# Patient Record
Sex: FEMALE | Race: WHITE | NOT HISPANIC OR LATINO
[De-identification: names, ages, dates, MRNs, and addresses within clinical notes are randomized per-mention and may not be internally consistent; named-entity substitution may affect disease eponyms.]

---

## 2017-03-28 ENCOUNTER — APPOINTMENT (OUTPATIENT)
Dept: HEART AND VASCULAR | Facility: CLINIC | Age: 67
End: 2017-03-28

## 2017-12-15 ENCOUNTER — APPOINTMENT (OUTPATIENT)
Dept: HEART AND VASCULAR | Facility: CLINIC | Age: 67
End: 2017-12-15
Payer: MEDICARE

## 2017-12-15 VITALS — SYSTOLIC BLOOD PRESSURE: 140 MMHG | HEART RATE: 75 BPM | DIASTOLIC BLOOD PRESSURE: 100 MMHG

## 2017-12-15 VITALS — SYSTOLIC BLOOD PRESSURE: 140 MMHG | HEART RATE: 74 BPM | DIASTOLIC BLOOD PRESSURE: 90 MMHG

## 2017-12-15 VITALS — SYSTOLIC BLOOD PRESSURE: 138 MMHG | DIASTOLIC BLOOD PRESSURE: 94 MMHG

## 2017-12-15 PROCEDURE — 99215 OFFICE O/P EST HI 40 MIN: CPT | Mod: 25

## 2017-12-15 PROCEDURE — 93000 ELECTROCARDIOGRAM COMPLETE: CPT

## 2017-12-15 PROCEDURE — 93306 TTE W/DOPPLER COMPLETE: CPT

## 2017-12-15 RX ORDER — MOMETASONE FUROATE 1 MG/G
0.1 OINTMENT TOPICAL
Qty: 45 | Refills: 0 | Status: DISCONTINUED | COMMUNITY
Start: 2017-11-20

## 2018-01-03 ENCOUNTER — TRANSCRIPTION ENCOUNTER (OUTPATIENT)
Age: 68
End: 2018-01-03

## 2018-01-16 ENCOUNTER — TRANSCRIPTION ENCOUNTER (OUTPATIENT)
Age: 68
End: 2018-01-16

## 2018-04-19 ENCOUNTER — APPOINTMENT (OUTPATIENT)
Dept: HEART AND VASCULAR | Facility: CLINIC | Age: 68
End: 2018-04-19
Payer: MEDICARE

## 2018-04-19 VITALS
WEIGHT: 134 LBS | HEART RATE: 78 BPM | HEIGHT: 60 IN | SYSTOLIC BLOOD PRESSURE: 134 MMHG | DIASTOLIC BLOOD PRESSURE: 100 MMHG | BODY MASS INDEX: 26.31 KG/M2

## 2018-04-19 VITALS — SYSTOLIC BLOOD PRESSURE: 144 MMHG | DIASTOLIC BLOOD PRESSURE: 88 MMHG

## 2018-04-19 DIAGNOSIS — I51.89 OTHER ILL-DEFINED HEART DISEASES: ICD-10-CM

## 2018-04-19 PROCEDURE — 93000 ELECTROCARDIOGRAM COMPLETE: CPT

## 2018-04-19 PROCEDURE — 99214 OFFICE O/P EST MOD 30 MIN: CPT | Mod: 25

## 2018-04-19 RX ORDER — FLUTICASONE PROPIONATE 50 UG/1
50 SPRAY, METERED NASAL
Qty: 16 | Refills: 0 | Status: DISCONTINUED | COMMUNITY
Start: 2018-01-21

## 2018-04-20 PROBLEM — I51.89 FAMILIAL HEART DISEASE: Status: ACTIVE | Noted: 2018-04-20

## 2018-12-17 ENCOUNTER — APPOINTMENT (OUTPATIENT)
Dept: HEART AND VASCULAR | Facility: CLINIC | Age: 68
End: 2018-12-17
Payer: MEDICARE

## 2018-12-17 ENCOUNTER — NON-APPOINTMENT (OUTPATIENT)
Age: 68
End: 2018-12-17

## 2018-12-17 VITALS
WEIGHT: 142 LBS | DIASTOLIC BLOOD PRESSURE: 86 MMHG | SYSTOLIC BLOOD PRESSURE: 124 MMHG | HEIGHT: 60 IN | HEART RATE: 62 BPM | BODY MASS INDEX: 27.88 KG/M2

## 2018-12-17 DIAGNOSIS — R00.2 PALPITATIONS: ICD-10-CM

## 2018-12-17 PROCEDURE — 93000 ELECTROCARDIOGRAM COMPLETE: CPT

## 2018-12-17 PROCEDURE — 99214 OFFICE O/P EST MOD 30 MIN: CPT | Mod: 25

## 2018-12-17 PROCEDURE — 93306 TTE W/DOPPLER COMPLETE: CPT

## 2018-12-17 NOTE — PHYSICAL EXAM
[Normal Oral Mucosa] : normal oral mucosa [No Oral Pallor] : no oral pallor [No Oral Cyanosis] : no oral cyanosis [Normal Jugular Venous A Waves Present] : normal jugular venous A waves present [Normal Jugular Venous V Waves Present] : normal jugular venous V waves present [No Jugular Venous Alvarez A Waves] : no jugular venous alvarez A waves [Respiration, Rhythm And Depth] : normal respiratory rhythm and effort [Exaggerated Use Of Accessory Muscles For Inspiration] : no accessory muscle use [Auscultation Breath Sounds / Voice Sounds] : lungs were clear to auscultation bilaterally [Heart Rate And Rhythm] : heart rate and rhythm were normal [Heart Sounds] : normal S1 and S2 [Murmurs] : no murmurs present [FreeTextEntry1] : 1/6 Systolic ejection murmur heard best at the left sternal border, R dp 1, L 2. [Abdomen Soft] : soft [Abdomen Tenderness] : non-tender [Abdomen Mass (___ Cm)] : no abdominal mass palpated [Nail Clubbing] : no clubbing of the fingernails [Cyanosis, Localized] : no localized cyanosis [Petechial Hemorrhages (___cm)] : no petechial hemorrhages [] : no ischemic changes

## 2018-12-17 NOTE — HISTORY OF PRESENT ILLNESS
[FreeTextEntry1] : The patient has occasional palpitations. They're mild brief and passed spontaneously. She walks for an hour without difficulty. She has dyspnea climbing 3 flights of stairs. The patient's blood pressure at home is 108-129/72-83.

## 2018-12-17 NOTE — DISCUSSION/SUMMARY
[FreeTextEntry1] : The patient has occasional brief palpitations. Her EKG is normal. The echocardiogram shows bileaflet mitral valve prolapse with mild to moderate mitral regurgitation. The ejection fraction is 60%. The regurgitation is not hemodynamically significant. The patient's exercise tolerance is good. She has a history of PVCs. Serious arrhythmia is unlikely. She will continue her atorvastatin.

## 2018-12-17 NOTE — REVIEW OF SYSTEMS
[Dyspnea on exertion] : dyspnea during exertion [Palpitations] : palpitations [Dizziness] : dizziness [Negative] : Heme/Lymph

## 2019-03-13 ENCOUNTER — RX RENEWAL (OUTPATIENT)
Age: 69
End: 2019-03-13

## 2019-06-05 ENCOUNTER — RX RENEWAL (OUTPATIENT)
Age: 69
End: 2019-06-05

## 2019-06-17 ENCOUNTER — TRANSCRIPTION ENCOUNTER (OUTPATIENT)
Age: 69
End: 2019-06-17

## 2019-06-20 ENCOUNTER — APPOINTMENT (OUTPATIENT)
Dept: HEART AND VASCULAR | Facility: CLINIC | Age: 69
End: 2019-06-20
Payer: MEDICARE

## 2019-06-20 DIAGNOSIS — I49.3 VENTRICULAR PREMATURE DEPOLARIZATION: ICD-10-CM

## 2019-06-20 PROCEDURE — 93351 STRESS TTE COMPLETE: CPT

## 2019-06-20 PROCEDURE — 99213 OFFICE O/P EST LOW 20 MIN: CPT | Mod: 25

## 2019-06-20 PROCEDURE — ZZZZZ: CPT

## 2019-06-20 NOTE — PHYSICAL EXAM
[Normal Oral Mucosa] : normal oral mucosa [No Oral Cyanosis] : no oral cyanosis [No Oral Pallor] : no oral pallor [Normal Jugular Venous V Waves Present] : normal jugular venous V waves present [Normal Jugular Venous A Waves Present] : normal jugular venous A waves present [No Jugular Venous Alvarez A Waves] : no jugular venous alvarez A waves [Respiration, Rhythm And Depth] : normal respiratory rhythm and effort [Exaggerated Use Of Accessory Muscles For Inspiration] : no accessory muscle use [Heart Rate And Rhythm] : heart rate and rhythm were normal [Auscultation Breath Sounds / Voice Sounds] : lungs were clear to auscultation bilaterally [Heart Sounds] : normal S1 and S2 [Murmurs] : no murmurs present [FreeTextEntry1] : 1/6 Systolic ejection murmur heard best at the left sternal border, R dp 1, L 2. [Abdomen Soft] : soft [Abdomen Tenderness] : non-tender [Abdomen Mass (___ Cm)] : no abdominal mass palpated [Nail Clubbing] : no clubbing of the fingernails [Cyanosis, Localized] : no localized cyanosis [] : no ischemic changes [Petechial Hemorrhages (___cm)] : no petechial hemorrhages

## 2019-06-20 NOTE — DISCUSSION/SUMMARY
[FreeTextEntry1] : The patient is physically active and has dyspnea on exertion. Her stress echocardiogram is normal. Significant coronary artery disease is unlikely. Frequent VPCs were noted but are asymptomatic. No treatment is necessary for this. Her blood pressure is controlled with diet. The patient's cholesterol has been very good. She will continue her current medication.

## 2019-06-20 NOTE — HISTORY OF PRESENT ILLNESS
[FreeTextEntry1] : The patient had a 24-hour blood pressure monitor which was reported normal. She walks 30-40 minutes carrying art supplies and has dyspnea. She can climb multiple flight of stairs. She has buttocks pain at rest. Her last cholesterol was 166.

## 2020-01-09 ENCOUNTER — APPOINTMENT (OUTPATIENT)
Dept: HEART AND VASCULAR | Facility: CLINIC | Age: 70
End: 2020-01-09
Payer: MEDICARE

## 2020-01-09 ENCOUNTER — NON-APPOINTMENT (OUTPATIENT)
Age: 70
End: 2020-01-09

## 2020-01-09 VITALS
HEART RATE: 61 BPM | WEIGHT: 132.5 LBS | SYSTOLIC BLOOD PRESSURE: 120 MMHG | BODY MASS INDEX: 26.01 KG/M2 | HEIGHT: 60 IN | DIASTOLIC BLOOD PRESSURE: 84 MMHG

## 2020-01-09 PROCEDURE — 93000 ELECTROCARDIOGRAM COMPLETE: CPT

## 2020-01-09 PROCEDURE — 93306 TTE W/DOPPLER COMPLETE: CPT

## 2020-01-09 PROCEDURE — 99213 OFFICE O/P EST LOW 20 MIN: CPT | Mod: 25

## 2020-01-12 NOTE — HISTORY OF PRESENT ILLNESS
[FreeTextEntry1] : The patient walks 7-10,000 steps a day. Her diet is good and she has lost 13 pounds. [place] the subway [place] without difficulty. The patient has dyspnea on a steep hill. She has not been dizzy.

## 2020-01-12 NOTE — DISCUSSION/SUMMARY
[FreeTextEntry1] : The patient has a history of mitral valve prolapse and ventricular tachycardia. She has mild dyspnea on exertion. She has no symptoms of arrhythmia. Her blood pressure is slightly elevated. Her echocardiogram shows an ejection fraction of 65% Bileaflet mitral valve prolapse and mitral regurgitation which is moderate. This is not hemodynamically significant. She declines a cardiac monitor. The patient's blood pressure is slightly elevated. She will improve her diet and exercise. She will monitor her blood pressure at home. The patient will continue her current medication.

## 2020-01-12 NOTE — PHYSICAL EXAM
[No Oral Pallor] : no oral pallor [No Oral Cyanosis] : no oral cyanosis [Normal Oral Mucosa] : normal oral mucosa [Normal Jugular Venous V Waves Present] : normal jugular venous V waves present [Normal Jugular Venous A Waves Present] : normal jugular venous A waves present [No Jugular Venous Alvarez A Waves] : no jugular venous alvarez A waves [Respiration, Rhythm And Depth] : normal respiratory rhythm and effort [Exaggerated Use Of Accessory Muscles For Inspiration] : no accessory muscle use [Auscultation Breath Sounds / Voice Sounds] : lungs were clear to auscultation bilaterally [Heart Sounds] : normal S1 and S2 [Heart Rate And Rhythm] : heart rate and rhythm were normal [Murmurs] : no murmurs present [Abdomen Soft] : soft [FreeTextEntry1] : 1/6 Systolic ejection murmur heard best at the left sternal border, R dp 1, L 2. [Abdomen Tenderness] : non-tender [Abdomen Mass (___ Cm)] : no abdominal mass palpated [Nail Clubbing] : no clubbing of the fingernails [Cyanosis, Localized] : no localized cyanosis [Petechial Hemorrhages (___cm)] : no petechial hemorrhages [] : no ischemic changes

## 2020-12-10 ENCOUNTER — NON-APPOINTMENT (OUTPATIENT)
Age: 70
End: 2020-12-10

## 2020-12-10 ENCOUNTER — APPOINTMENT (OUTPATIENT)
Dept: HEART AND VASCULAR | Facility: CLINIC | Age: 70
End: 2020-12-10
Payer: MEDICARE

## 2020-12-10 VITALS
HEIGHT: 60 IN | DIASTOLIC BLOOD PRESSURE: 92 MMHG | WEIGHT: 130 LBS | SYSTOLIC BLOOD PRESSURE: 140 MMHG | BODY MASS INDEX: 25.52 KG/M2 | HEART RATE: 63 BPM

## 2020-12-10 VITALS — DIASTOLIC BLOOD PRESSURE: 74 MMHG | SYSTOLIC BLOOD PRESSURE: 140 MMHG

## 2020-12-10 VITALS — TEMPERATURE: 97.2 F

## 2020-12-10 DIAGNOSIS — Z00.00 ENCOUNTER FOR GENERAL ADULT MEDICAL EXAMINATION W/OUT ABNORMAL FINDINGS: ICD-10-CM

## 2020-12-10 PROCEDURE — 99213 OFFICE O/P EST LOW 20 MIN: CPT | Mod: 25

## 2020-12-10 PROCEDURE — 93000 ELECTROCARDIOGRAM COMPLETE: CPT

## 2020-12-11 LAB
ANION GAP SERPL CALC-SCNC: 19 MMOL/L
BASOPHILS # BLD AUTO: 0.03 K/UL
BASOPHILS NFR BLD AUTO: 0.5 %
BUN SERPL-MCNC: 15 MG/DL
CALCIUM SERPL-MCNC: 10.2 MG/DL
CHLORIDE SERPL-SCNC: 102 MMOL/L
CHOLEST SERPL-MCNC: 187 MG/DL
CO2 SERPL-SCNC: 19 MMOL/L
CREAT SERPL-MCNC: 0.78 MG/DL
EOSINOPHIL # BLD AUTO: 0.01 K/UL
EOSINOPHIL NFR BLD AUTO: 0.2 %
ESTIMATED AVERAGE GLUCOSE: 114 MG/DL
GLUCOSE SERPL-MCNC: 99 MG/DL
HBA1C MFR BLD HPLC: 5.6 %
HCT VFR BLD CALC: 45.5 %
HDLC SERPL-MCNC: 87 MG/DL
HGB BLD-MCNC: 14.2 G/DL
IMM GRANULOCYTES NFR BLD AUTO: 0.2 %
LDLC SERPL CALC-MCNC: 85 MG/DL
LYMPHOCYTES # BLD AUTO: 1.71 K/UL
LYMPHOCYTES NFR BLD AUTO: 26.3 %
MAN DIFF?: NORMAL
MCHC RBC-ENTMCNC: 29.2 PG
MCHC RBC-ENTMCNC: 31.2 GM/DL
MCV RBC AUTO: 93.4 FL
MONOCYTES # BLD AUTO: 0.4 K/UL
MONOCYTES NFR BLD AUTO: 6.1 %
NEUTROPHILS # BLD AUTO: 4.35 K/UL
NEUTROPHILS NFR BLD AUTO: 66.7 %
NONHDLC SERPL-MCNC: 100 MG/DL
PLATELET # BLD AUTO: 294 K/UL
POTASSIUM SERPL-SCNC: 4.5 MMOL/L
RBC # BLD: 4.87 M/UL
RBC # FLD: 13.4 %
SODIUM SERPL-SCNC: 141 MMOL/L
TRIGL SERPL-MCNC: 76 MG/DL
TSH SERPL-ACNC: 1.89 UIU/ML
WBC # FLD AUTO: 6.51 K/UL

## 2020-12-13 NOTE — DISCUSSION/SUMMARY
[FreeTextEntry1] : 70 year old female with PMHX of MVP, HTN and VT on Holter in 2015 here for follow up.\par \par MVP: Currently asymptomatic. EKG today Sinus Arrhythmia with 63 bpm, similar to history. Continue to monitor. Echo at next visit.  \par HTN: Slightly elevated. States her BP at home is usually lower. \par HLD: Sent for non fasting labs today. Continue with Atorvastatin 10mg for now.\par VT on Holter: Currently asymptomatic. Would like a repeat evaluation. PT is declining at this time and will reconsider when she returns to [facility] at next visit. \par \par Fu 4 months with echo and consider repeat holter\par  I have discussed the case with TERESA Katz. I have personally performed a history, physical exam, and my own medical decision making. I have reviewed the note and agree with the findings and plan .\par 
CHF

## 2020-12-13 NOTE — HISTORY OF PRESENT ILLNESS
[FreeTextEntry1] : 70 year old female with PMHX of MVP, HTN and VT on Holter in 2015 here for follow up.\par \par Since last visit, patient is overall doing very well. She has resided in [place] during COVID pandemic and has focused on her diet and exercise. She is taking part in Pilates classes, averaging 1 hour sessions 3 times a week, all without complications. She has also been doing Noom Diet for > 1 year which focuses on avoiding certain foods and limiting proportions. She reports a weight loss of >15 lbs in the past 1 year. She continues to walk up 3 flights of stairs within her home without any chest pain, shortness of breath, palpitations, dizziness, loc or claudications. \par \par She still resides in [place] and will be returning end of March 2021 for full physical with her PCP. She is non  fasting today. \par  Lynnette Ward  (RN)  2017 02:44:21

## 2020-12-13 NOTE — REVIEW OF SYSTEMS
[Fever] : no fever [Chills] : no chills [Shortness Of Breath] : no shortness of breath [Dyspnea on exertion] : not dyspnea during exertion [Chest  Pressure] : no chest pressure [Chest Pain] : no chest pain [Lower Ext Edema] : no extremity edema [Palpitations] : no palpitations [Cough] : no cough [Abdominal Pain] : no abdominal pain [Nausea] : no nausea [Vomiting] : no vomiting [Heartburn] : no heartburn [Change in Appetite] : no change in appetite [Muscle Cramps] : no muscle cramps [Dizziness] : no dizziness [Easy Bleeding] : no tendency for easy bleeding [Easy Bruising] : no tendency for easy bruising

## 2020-12-13 NOTE — PHYSICAL EXAM
[General Appearance - Well Developed] : well developed [Normal Appearance] : normal appearance [General Appearance - Well Nourished] : well nourished [Respiration, Rhythm And Depth] : normal respiratory rhythm and effort [Auscultation Breath Sounds / Voice Sounds] : lungs were clear to auscultation bilaterally [Heart Rate And Rhythm] : heart rate and rhythm were normal [Heart Sounds] : normal S1 and S2 [Arterial Pulses Normal] : the arterial pulses were normal [Edema] : no peripheral edema present [Veins - Varicosity Changes] : no varicosital changes were noted in the lower extremities [Abdomen Soft] : soft [Abdomen Tenderness] : non-tender [Abnormal Walk] : normal gait [Gait - Sufficient For Exercise Testing] : the gait was sufficient for exercise testing [Skin Color & Pigmentation] : normal skin color and pigmentation [] : no rash [No Venous Stasis] : no venous stasis [Skin Lesions] : no skin lesions [Oriented To Time, Place, And Person] : oriented to person, place, and time [Impaired Insight] : insight and judgment were intact [Affect] : the affect was normal [Mood] : the mood was normal [FreeTextEntry1] : Grade 1 / 6 systolic murmur, Bilateral DP 2+

## 2020-12-14 ENCOUNTER — NON-APPOINTMENT (OUTPATIENT)
Age: 70
End: 2020-12-14

## 2020-12-14 LAB
HCT VFR BLD CALC: 47 %
HGB BLD-MCNC: 14.5 G/DL

## 2021-06-06 ENCOUNTER — RESULT CHARGE (OUTPATIENT)
Age: 71
End: 2021-06-06

## 2021-06-07 ENCOUNTER — NON-APPOINTMENT (OUTPATIENT)
Age: 71
End: 2021-06-07

## 2021-06-07 ENCOUNTER — APPOINTMENT (OUTPATIENT)
Dept: HEART AND VASCULAR | Facility: CLINIC | Age: 71
End: 2021-06-07
Payer: MEDICARE

## 2021-06-07 VITALS — SYSTOLIC BLOOD PRESSURE: 150 MMHG | DIASTOLIC BLOOD PRESSURE: 86 MMHG

## 2021-06-07 VITALS
BODY MASS INDEX: 26.5 KG/M2 | WEIGHT: 135 LBS | SYSTOLIC BLOOD PRESSURE: 146 MMHG | HEART RATE: 61 BPM | HEIGHT: 60 IN | DIASTOLIC BLOOD PRESSURE: 82 MMHG

## 2021-06-07 VITALS — TEMPERATURE: 98 F

## 2021-06-07 PROCEDURE — 93306 TTE W/DOPPLER COMPLETE: CPT

## 2021-06-07 PROCEDURE — 99214 OFFICE O/P EST MOD 30 MIN: CPT | Mod: 25

## 2021-06-07 PROCEDURE — ZZZZZ: CPT

## 2021-06-07 PROCEDURE — 93000 ELECTROCARDIOGRAM COMPLETE: CPT

## 2021-06-07 NOTE — PHYSICAL EXAM
[Well Developed] : well developed [Well Nourished] : well nourished [No Acute Distress] : no acute distress [Normal Conjunctiva] : normal conjunctiva [Normal Venous Pressure] : normal venous pressure [No Carotid Bruit] : no carotid bruit [Normal S1, S2] : normal S1, S2 [No Rub] : no rub [No Gallop] : no gallop [Clear Lung Fields] : clear lung fields [Good Air Entry] : good air entry [No Respiratory Distress] : no respiratory distress  [Soft] : abdomen soft [Non Tender] : non-tender [No Masses/organomegaly] : no masses/organomegaly [Normal Bowel Sounds] : normal bowel sounds [Normal Gait] : normal gait [No Edema] : no edema [No Cyanosis] : no cyanosis [No Clubbing] : no clubbing [No Varicosities] : no varicosities [No Rash] : no rash [No Skin Lesions] : no skin lesions [Moves all extremities] : moves all extremities [No Focal Deficits] : no focal deficits [Normal Speech] : normal speech [Alert and Oriented] : alert and oriented [Normal memory] : normal memory [de-identified] : 1/6 systolic ejection murmur heard best at the left sternal border, [de-identified] :  right dorsalis pedis 1+, left 2+

## 2021-06-07 NOTE — DISCUSSION/SUMMARY
[FreeTextEntry1] : The patient has a new elevation in her blood pressure.  She has gained weight.  She will attempt to improve her diet.  She will get her blood pressure checked at the pharmacy twice and report back to us.  The patient has mitral valve prolapse with mitral regurgitation.  Mitral regurgitation is moderate and stable.  It is not hemodynamically significant.

## 2021-06-07 NOTE — HISTORY OF PRESENT ILLNESS
[FreeTextEntry1] : The patient walks up 3 flights of stairs and ambulates 10–20 blocks without difficulty.  She has been eating out and gained weight.  She denies chest pain palpitations or dizziness.

## 2021-11-30 ENCOUNTER — APPOINTMENT (OUTPATIENT)
Dept: HEART AND VASCULAR | Facility: CLINIC | Age: 71
End: 2021-11-30
Payer: MEDICARE

## 2021-11-30 VITALS — TEMPERATURE: 97.5 F | SYSTOLIC BLOOD PRESSURE: 144 MMHG | DIASTOLIC BLOOD PRESSURE: 60 MMHG

## 2021-11-30 DIAGNOSIS — I47.2 VENTRICULAR TACHYCARDIA: ICD-10-CM

## 2021-11-30 DIAGNOSIS — R03.0 ELEVATED BLOOD-PRESSURE READING, W/OUT DIAGNOSIS OF HYPERTENSION: ICD-10-CM

## 2021-11-30 PROCEDURE — 93351 STRESS TTE COMPLETE: CPT

## 2021-11-30 PROCEDURE — 99214 OFFICE O/P EST MOD 30 MIN: CPT | Mod: 25

## 2021-12-01 NOTE — REVIEW OF SYSTEMS
[Fever] : no fever [Chills] : no chills [SOB] : no shortness of breath [Dyspnea on exertion] : not dyspnea during exertion [Chest Discomfort] : no chest discomfort [Lower Ext Edema] : no extremity edema [Leg Claudication] : no intermittent leg claudication [Palpitations] : no palpitations [Orthopnea] : no orthopnea [PND] : no PND [Cough] : no cough [Dizziness] : no dizziness [Numbness (Hypoesthesia)] : no numbness [Weakness] : no weakness [Speech Disturbance] : no speech disturbance [Easy Bleeding] : no tendency for easy bleeding

## 2021-12-01 NOTE — DISCUSSION/SUMMARY
[FreeTextEntry1] : 71 year old female with PMHX of MVP, HTN and VT ( on Holter 2013, longest 8 beats at 100 bpm) here for follow up for evaluation of abnormal EKG. \par \par VT on Holter (2013):  Repeat Holter 2015 revealed no VT. Currently asymptomatic. Few PVCs noted during stress test. Stress Test unremarkable.   \par HTN: At home blood pressure controlled. Continue monitoring\par HLD: Controlled. LDL goal < 100. LDL 95 (11/16/2021)Continue with Atorvastatin 10mg.\par \par Follow up 6 months with echocardiogram. \par I, Dr. Reny Moe personally performed the evaluation and management services for this patient who presents today with a changed problem.  The evaluation and management includes conducting the examination assessing all conditions and establishing a new plan of care.  Today my ACP Elif Katz was here and recorded the evaluation and management services.  The patient had a worsening of her ventricular arrhythmia based on her preoperative EKG.  She has a history of ventricular triplets.  With only mild structural heart disease serious arrhythmia is unlikely.  Her blood pressure at home has been normal.  The patient is planning a low risk procedure.  Her RCRI score is 0.  She is low risk.

## 2021-12-01 NOTE — REASON FOR VISIT
[Symptom and Test Evaluation] : symptom and test evaluation [Follow-Up - Clinic] : a clinic follow-up of [Arrhythmia/ECG Abnorrmalities] : arrhythmia/ECG abnormalities [FreeTextEntry1] : [place]

## 2021-12-01 NOTE — HISTORY OF PRESENT ILLNESS
[FreeTextEntry1] : 71 year old female with PMHX of MVP, HTN and VT ( on Holter 2013, longest 8 beats at 100 bpm) here for follow up for evaluation of abnormal EKG. \par \par Patient was in the process of getting medical clearance for glaucoma surgery when she was noted to have multiple PVCs on her EKG and was referred to her cardiologist for evaluation. Patient reports no palpitations, dizziness, shortness of breath at rest, leg swelling or neuro focal deficits. \par \par She remains very active. She can walk up 3 flights of stairs regularly and does Pilates without complications. She currently denies any chest pains, shortness of breath or claudications. \par \par Her at home blood pressures average 110-120's/ 80-90s with few outliers in 130mmHg. She is not requiring cardiac clearance at this visit. \par \par \par

## 2021-12-01 NOTE — PHYSICAL EXAM
[Well Developed] : well developed [Well Nourished] : well nourished [No Acute Distress] : no acute distress [Normal Conjunctiva] : normal conjunctiva [Normal Venous Pressure] : normal venous pressure [No Carotid Bruit] : no carotid bruit [Normal S1, S2] : normal S1, S2 [No Murmur] : no murmur [Clear Lung Fields] : clear lung fields [Good Air Entry] : good air entry [No Respiratory Distress] : no respiratory distress  [Normal Gait] : normal gait [No Edema] : no edema [Normal Radial B/L] : normal radial B/L [No Rash] : no rash [Moves all extremities] : moves all extremities [No Focal Deficits] : no focal deficits [Alert and Oriented] : alert and oriented [Normal memory] : normal memory

## 2022-06-08 ENCOUNTER — APPOINTMENT (OUTPATIENT)
Dept: HEART AND VASCULAR | Facility: CLINIC | Age: 72
End: 2022-06-08
Payer: MEDICARE

## 2022-06-08 ENCOUNTER — NON-APPOINTMENT (OUTPATIENT)
Age: 72
End: 2022-06-08

## 2022-06-08 VITALS
TEMPERATURE: 97 F | DIASTOLIC BLOOD PRESSURE: 76 MMHG | WEIGHT: 134.5 LBS | HEIGHT: 60 IN | HEART RATE: 83 BPM | BODY MASS INDEX: 26.41 KG/M2 | SYSTOLIC BLOOD PRESSURE: 134 MMHG

## 2022-06-08 DIAGNOSIS — R06.89 OTHER ABNORMALITIES OF BREATHING: ICD-10-CM

## 2022-06-08 PROCEDURE — 93000 ELECTROCARDIOGRAM COMPLETE: CPT

## 2022-06-08 PROCEDURE — 99214 OFFICE O/P EST MOD 30 MIN: CPT | Mod: 25

## 2022-06-08 RX ORDER — DICLOFENAC SODIUM 3 G/100G
3 GEL TOPICAL
Qty: 100 | Refills: 0 | Status: ACTIVE | COMMUNITY
Start: 2022-05-23

## 2022-06-08 NOTE — PHYSICAL EXAM
[Well Developed] : well developed [Well Nourished] : well nourished [No Acute Distress] : no acute distress [Normal Conjunctiva] : normal conjunctiva [Normal Venous Pressure] : normal venous pressure [No Carotid Bruit] : no carotid bruit [Normal S1, S2] : normal S1, S2 [No Rub] : no rub [No Gallop] : no gallop [Clear Lung Fields] : clear lung fields [Good Air Entry] : good air entry [No Respiratory Distress] : no respiratory distress  [Soft] : abdomen soft [Non Tender] : non-tender [No Masses/organomegaly] : no masses/organomegaly [Normal Bowel Sounds] : normal bowel sounds [Normal Gait] : normal gait [No Edema] : no edema [No Cyanosis] : no cyanosis [No Clubbing] : no clubbing [No Varicosities] : no varicosities [No Rash] : no rash [No Skin Lesions] : no skin lesions [Moves all extremities] : moves all extremities [No Focal Deficits] : no focal deficits [Normal Speech] : normal speech [Alert and Oriented] : alert and oriented [Normal memory] : normal memory [de-identified] : 1/6 systolic ejection murmur heard best at the left sternal border, [de-identified] :  right dorsalis pedis 1+, left 2+

## 2022-06-08 NOTE — DISCUSSION/SUMMARY
[FreeTextEntry1] : The patient has new dyspnea but is on fairly heavy exertion.  Her EKG is normal.  Last echocardiogram showed moderate mitral regurgitation which was not hemodynamically significant.  Last stress test was normal.  She will return for an echocardiogram.  Her cholesterol has been very good.  She will continue atorvastatin.

## 2022-06-08 NOTE — HISTORY OF PRESENT ILLNESS
[FreeTextEntry1] : Patient walks 30 minutes without difficulty.  She has dyspnea on an incline.  When she climbs 2 flights of stairs she sometimes needs to stop.  Her diet has been good.

## 2022-12-05 ENCOUNTER — OFFICE (OUTPATIENT)
Dept: URBAN - METROPOLITAN AREA CLINIC 28 | Facility: CLINIC | Age: 72
Setting detail: OPHTHALMOLOGY
End: 2022-12-05
Payer: MEDICARE

## 2022-12-05 ENCOUNTER — RX ONLY (RX ONLY)
Age: 72
End: 2022-12-05

## 2022-12-05 DIAGNOSIS — H16.223: ICD-10-CM

## 2022-12-05 DIAGNOSIS — H11.153: ICD-10-CM

## 2022-12-05 DIAGNOSIS — H25.13: ICD-10-CM

## 2022-12-05 PROCEDURE — 92004 COMPRE OPH EXAM NEW PT 1/>: CPT | Performed by: OPHTHALMOLOGY

## 2022-12-05 ASSESSMENT — REFRACTION_CURRENTRX
OS_AXIS: 097
OS_ADD: +3.00
OD_ADD: +2.75
OD_SPHERE: +1.00
OD_AXIS: 079
OD_OVR_VA: 20/
OS_CYLINDER: -0.75
OD_AXIS: 075
OS_OVR_VA: 20/
OD_OVR_VA: 20/
OS_CYLINDER: -0.50
OS_ADD: +2.75
OD_ADD: +3.00
OS_SPHERE: +2.00
OD_SPHERE: +1.00
OS_AXIS: 106
OS_OVR_VA: 20/
OD_CYLINDER: -0.50
OS_SPHERE: +1.75
OD_CYLINDER: -0.75

## 2022-12-05 ASSESSMENT — TEAR BREAK UP TIME (TBUT)
OS_TBUT: 1+
OD_TBUT: 1+

## 2022-12-05 ASSESSMENT — REFRACTION_AUTOREFRACTION
OD_CYLINDER: -0.75
OD_AXIS: 081
OS_SPHERE: +2.00
OS_AXIS: 108
OS_CYLINDER: -1.00
OD_SPHERE: +1.25

## 2022-12-05 ASSESSMENT — KERATOMETRY
OD_K2POWER_DIOPTERS: 44.25
OS_AXISANGLE_DEGREES: 029
OD_AXISANGLE_DEGREES: 135
OS_K1POWER_DIOPTERS: 43.75
OS_K2POWER_DIOPTERS: 44.25
OD_K1POWER_DIOPTERS: 43.75

## 2022-12-05 ASSESSMENT — TONOMETRY
OS_IOP_MMHG: 18
OD_IOP_MMHG: 20

## 2022-12-05 ASSESSMENT — VISUAL ACUITY
OD_BCVA: 20/25-2
OS_BCVA: 20/20-2

## 2022-12-05 ASSESSMENT — AXIALLENGTH_DERIVED
OD_AL: 23.0788
OS_AL: 22.8481

## 2022-12-05 ASSESSMENT — CONFRONTATIONAL VISUAL FIELD TEST (CVF)
OD_FINDINGS: FULL
OS_FINDINGS: FULL

## 2022-12-05 ASSESSMENT — SPHEQUIV_DERIVED
OS_SPHEQUIV: 1.5
OD_SPHEQUIV: 0.875

## 2022-12-08 ENCOUNTER — APPOINTMENT (OUTPATIENT)
Dept: HEART AND VASCULAR | Facility: CLINIC | Age: 72
End: 2022-12-08

## 2022-12-08 ENCOUNTER — NON-APPOINTMENT (OUTPATIENT)
Age: 72
End: 2022-12-08

## 2022-12-08 VITALS
SYSTOLIC BLOOD PRESSURE: 145 MMHG | OXYGEN SATURATION: 96 % | WEIGHT: 136 LBS | HEIGHT: 60 IN | BODY MASS INDEX: 26.7 KG/M2 | TEMPERATURE: 97.8 F | HEART RATE: 74 BPM | DIASTOLIC BLOOD PRESSURE: 95 MMHG

## 2022-12-08 VITALS — DIASTOLIC BLOOD PRESSURE: 87 MMHG | SYSTOLIC BLOOD PRESSURE: 136 MMHG

## 2022-12-08 PROCEDURE — 93000 ELECTROCARDIOGRAM COMPLETE: CPT

## 2022-12-08 PROCEDURE — 93306 TTE W/DOPPLER COMPLETE: CPT

## 2022-12-08 PROCEDURE — 99214 OFFICE O/P EST MOD 30 MIN: CPT

## 2022-12-10 NOTE — HISTORY OF PRESENT ILLNESS
[FreeTextEntry1] : The patient walks without difficulty.  She has been doing Pilates.  Her blood pressure at home was 118/82 and 116/78.  She has been traveling and off her diet.

## 2022-12-10 NOTE — PHYSICAL EXAM
[Well Developed] : well developed [Well Nourished] : well nourished [No Acute Distress] : no acute distress [Normal Conjunctiva] : normal conjunctiva [Normal Venous Pressure] : normal venous pressure [No Carotid Bruit] : no carotid bruit [Normal S1, S2] : normal S1, S2 [No Rub] : no rub [No Gallop] : no gallop [Clear Lung Fields] : clear lung fields [Good Air Entry] : good air entry [No Respiratory Distress] : no respiratory distress  [Soft] : abdomen soft [Non Tender] : non-tender [No Masses/organomegaly] : no masses/organomegaly [Normal Bowel Sounds] : normal bowel sounds [Normal Gait] : normal gait [No Edema] : no edema [No Cyanosis] : no cyanosis [No Clubbing] : no clubbing [No Varicosities] : no varicosities [No Rash] : no rash [No Skin Lesions] : no skin lesions [Moves all extremities] : moves all extremities [No Focal Deficits] : no focal deficits [Normal Speech] : normal speech [Alert and Oriented] : alert and oriented [Normal memory] : normal memory [de-identified] : 2/6 systolic ejection murmur heard best at the left sternal border, [de-identified] :  right dorsalis pedis 1+, left 2+

## 2022-12-10 NOTE — DISCUSSION/SUMMARY
[EKG obtained to assist in diagnosis and management of assessed problem(s)] : EKG obtained to assist in diagnosis and management of assessed problem(s) [FreeTextEntry1] : The patient does moderate physical activity and is asymptomatic.  Her blood pressure has increased and was uncontrolled in the office.  However, the blood pressure at home is controlled.  The EKG shows low voltage.  The echocardiogram reveals mitral valve prolapse with mild mitral regurgitation.  This is not hemodynamically significant.  She will continue atorvastatin.

## 2023-04-03 ENCOUNTER — OFFICE (OUTPATIENT)
Dept: URBAN - METROPOLITAN AREA CLINIC 28 | Facility: CLINIC | Age: 73
Setting detail: OPHTHALMOLOGY
End: 2023-04-03
Payer: MEDICARE

## 2023-04-03 DIAGNOSIS — H25.13: ICD-10-CM

## 2023-04-03 DIAGNOSIS — H16.223: ICD-10-CM

## 2023-04-03 DIAGNOSIS — H11.153: ICD-10-CM

## 2023-04-03 DIAGNOSIS — H40.013: ICD-10-CM

## 2023-04-03 PROCEDURE — 76514 ECHO EXAM OF EYE THICKNESS: CPT | Performed by: OPHTHALMOLOGY

## 2023-04-03 PROCEDURE — 92133 CPTRZD OPH DX IMG PST SGM ON: CPT | Performed by: OPHTHALMOLOGY

## 2023-04-03 PROCEDURE — 92012 INTRM OPH EXAM EST PATIENT: CPT | Performed by: OPHTHALMOLOGY

## 2023-04-03 ASSESSMENT — REFRACTION_CURRENTRX
OD_OVR_VA: 20/
OS_CYLINDER: -0.50
OD_ADD: +2.75
OS_ADD: +3.00
OD_CYLINDER: -0.75
OD_OVR_VA: 20/
OS_CYLINDER: -0.75
OD_CYLINDER: -0.50
OD_ADD: +3.00
OD_AXIS: 078
OS_AXIS: 097
OS_AXIS: 100
OD_AXIS: 079
OS_SPHERE: +2.00
OS_OVR_VA: 20/
OS_ADD: +2.75
OS_SPHERE: +1.75
OD_SPHERE: +1.00
OS_OVR_VA: 20/
OD_SPHERE: +1.00

## 2023-04-03 ASSESSMENT — VISUAL ACUITY
OD_BCVA: 20/30BLUR
OS_BCVA: 20/20-1

## 2023-04-03 ASSESSMENT — KERATOMETRY
OS_K2POWER_DIOPTERS: 44.25
OS_K1POWER_DIOPTERS: 43.75
OS_AXISANGLE_DEGREES: 023
OD_K2POWER_DIOPTERS: 44.25
OD_AXISANGLE_DEGREES: 142
OD_K1POWER_DIOPTERS: 43.75

## 2023-04-03 ASSESSMENT — CONFRONTATIONAL VISUAL FIELD TEST (CVF)
OS_FINDINGS: FULL
OD_FINDINGS: FULL

## 2023-04-03 ASSESSMENT — AXIALLENGTH_DERIVED
OD_AL: 23.2194
OS_AL: 22.8481

## 2023-04-03 ASSESSMENT — SPHEQUIV_DERIVED
OS_SPHEQUIV: 1.5
OD_SPHEQUIV: 0.5

## 2023-04-03 ASSESSMENT — TONOMETRY
OD_IOP_MMHG: 21
OS_IOP_MMHG: 21

## 2023-04-03 ASSESSMENT — REFRACTION_AUTOREFRACTION
OD_CYLINDER: -1.00
OS_SPHERE: +2.00
OD_AXIS: 076
OS_CYLINDER: -1.00
OD_SPHERE: +1.00
OS_AXIS: 104

## 2023-04-03 ASSESSMENT — PACHYMETRY
OS_CT_UM: 545
OS_CT_CORRECTION: 0
OD_CT_UM: 544
OD_CT_CORRECTION: 0

## 2023-04-03 ASSESSMENT — TEAR BREAK UP TIME (TBUT)
OS_TBUT: 1+
OD_TBUT: 1+

## 2023-07-12 ENCOUNTER — NON-APPOINTMENT (OUTPATIENT)
Age: 73
End: 2023-07-12

## 2023-07-13 ENCOUNTER — NON-APPOINTMENT (OUTPATIENT)
Age: 73
End: 2023-07-13

## 2023-07-13 ENCOUNTER — APPOINTMENT (OUTPATIENT)
Dept: HEART AND VASCULAR | Facility: CLINIC | Age: 73
End: 2023-07-13
Payer: MEDICARE

## 2023-07-13 VITALS
DIASTOLIC BLOOD PRESSURE: 90 MMHG | HEIGHT: 60 IN | OXYGEN SATURATION: 98 % | SYSTOLIC BLOOD PRESSURE: 128 MMHG | HEART RATE: 84 BPM | BODY MASS INDEX: 26.7 KG/M2 | TEMPERATURE: 97.8 F | WEIGHT: 136 LBS

## 2023-07-13 DIAGNOSIS — I34.0 NONRHEUMATIC MITRAL (VALVE) INSUFFICIENCY: ICD-10-CM

## 2023-07-13 PROCEDURE — 93000 ELECTROCARDIOGRAM COMPLETE: CPT

## 2023-07-13 PROCEDURE — 99214 OFFICE O/P EST MOD 30 MIN: CPT | Mod: 25

## 2023-07-13 NOTE — HISTORY OF PRESENT ILLNESS
[FreeTextEntry1] : The patient has possible vertigo.  It is positional and occurred on a cruise.  She walks for 30 minutes and climbs 3 flights of stairs without difficulty.  Her home blood pressures are over 80 diastolic half the time.  She is not on a low-salt diet.

## 2023-07-13 NOTE — DISCUSSION/SUMMARY
[FreeTextEntry1] : The patient had dizziness which is likely vertigo and positional.  Her EKG shows mild ST depression.  She is physically active without difficulty.  She has new hypertension both at home and at the office.  She will start losartan 25 mg daily.  She will return for stress echocardiogram.

## 2023-07-13 NOTE — PHYSICAL EXAM
[Well Developed] : well developed [Well Nourished] : well nourished [No Acute Distress] : no acute distress [Normal Conjunctiva] : normal conjunctiva [Normal Venous Pressure] : normal venous pressure [No Carotid Bruit] : no carotid bruit [Normal S1, S2] : normal S1, S2 [No Rub] : no rub [No Gallop] : no gallop [Clear Lung Fields] : clear lung fields [Good Air Entry] : good air entry [No Respiratory Distress] : no respiratory distress  [Soft] : abdomen soft [Non Tender] : non-tender [No Masses/organomegaly] : no masses/organomegaly [Normal Bowel Sounds] : normal bowel sounds [Normal Gait] : normal gait [No Edema] : no edema [No Cyanosis] : no cyanosis [No Clubbing] : no clubbing [No Varicosities] : no varicosities [No Rash] : no rash [No Skin Lesions] : no skin lesions [Moves all extremities] : moves all extremities [No Focal Deficits] : no focal deficits [Normal Speech] : normal speech [Alert and Oriented] : alert and oriented [Normal memory] : normal memory [de-identified] : 2/6 systolic ejection murmur heard best at the left sternal border, [de-identified] :  right dorsalis pedis 1+, left 2+

## 2023-08-16 ENCOUNTER — TRANSCRIPTION ENCOUNTER (OUTPATIENT)
Age: 73
End: 2023-08-16

## 2023-09-10 ENCOUNTER — RX RENEWAL (OUTPATIENT)
Age: 73
End: 2023-09-10

## 2023-09-10 RX ORDER — ATORVASTATIN CALCIUM 10 MG/1
10 TABLET, FILM COATED ORAL
Qty: 90 | Refills: 3 | Status: ACTIVE | COMMUNITY
Start: 2018-04-19 | End: 1900-01-01

## 2023-10-09 ENCOUNTER — OFFICE (OUTPATIENT)
Dept: URBAN - METROPOLITAN AREA CLINIC 28 | Facility: CLINIC | Age: 73
Setting detail: OPHTHALMOLOGY
End: 2023-10-09
Payer: MEDICARE

## 2023-10-09 DIAGNOSIS — H40.013: ICD-10-CM

## 2023-10-09 DIAGNOSIS — H25.13: ICD-10-CM

## 2023-10-09 DIAGNOSIS — H11.153: ICD-10-CM

## 2023-10-09 DIAGNOSIS — H16.223: ICD-10-CM

## 2023-10-09 PROCEDURE — 92014 COMPRE OPH EXAM EST PT 1/>: CPT | Performed by: OPHTHALMOLOGY

## 2023-10-09 PROCEDURE — 92133 CPTRZD OPH DX IMG PST SGM ON: CPT | Performed by: OPHTHALMOLOGY

## 2023-10-09 ASSESSMENT — TEAR BREAK UP TIME (TBUT)
OD_TBUT: 1+
OS_TBUT: 1+

## 2023-10-09 ASSESSMENT — REFRACTION_CURRENTRX
OS_CYLINDER: -0.75
OD_ADD: +3.00
OD_ADD: +2.75
OD_SPHERE: +1.00
OS_ADD: +2.75
OS_ADD: +3.00
OS_SPHERE: +1.75
OS_SPHERE: +2.00
OS_OVR_VA: 20/
OD_OVR_VA: 20/
OS_AXIS: 094
OS_AXIS: 097
OD_CYLINDER: -0.50
OD_SPHERE: +1.00
OS_OVR_VA: 20/
OD_CYLINDER: -0.75
OD_AXIS: 079
OS_CYLINDER: -0.50
OD_OVR_VA: 20/
OD_AXIS: 075

## 2023-10-09 ASSESSMENT — KERATOMETRY
OS_K1POWER_DIOPTERS: 43.50
OS_AXISANGLE_DEGREES: 023
OD_K1POWER_DIOPTERS: 43.75
OD_K2POWER_DIOPTERS: 44.00
OD_AXISANGLE_DEGREES: 143
OS_K2POWER_DIOPTERS: 44.00

## 2023-10-09 ASSESSMENT — SPHEQUIV_DERIVED
OD_SPHEQUIV: 0.75
OS_SPHEQUIV: 1.75

## 2023-10-09 ASSESSMENT — AXIALLENGTH_DERIVED
OD_AL: 23.1697
OS_AL: 22.8428

## 2023-10-09 ASSESSMENT — REFRACTION_AUTOREFRACTION
OS_CYLINDER: -1.00
OS_SPHERE: +2.25
OD_SPHERE: +1.25
OD_AXIS: 078
OD_CYLINDER: -1.00
OS_AXIS: 104

## 2023-10-09 ASSESSMENT — PACHYMETRY
OS_CT_UM: 545
OS_CT_CORRECTION: 0
OD_CT_CORRECTION: 0
OD_CT_UM: 544

## 2023-10-09 ASSESSMENT — TONOMETRY
OD_IOP_MMHG: 21
OS_IOP_MMHG: 21

## 2023-10-09 ASSESSMENT — VISUAL ACUITY
OS_BCVA: 20/20-1
OD_BCVA: 20/20-1

## 2023-12-14 ENCOUNTER — APPOINTMENT (OUTPATIENT)
Dept: HEART AND VASCULAR | Facility: CLINIC | Age: 73
End: 2023-12-14
Payer: MEDICARE

## 2023-12-14 VITALS
DIASTOLIC BLOOD PRESSURE: 84 MMHG | HEIGHT: 60 IN | HEART RATE: 66 BPM | SYSTOLIC BLOOD PRESSURE: 122 MMHG | WEIGHT: 136 LBS | BODY MASS INDEX: 26.7 KG/M2

## 2023-12-14 DIAGNOSIS — R94.31 ABNORMAL ELECTROCARDIOGRAM [ECG] [EKG]: ICD-10-CM

## 2023-12-14 DIAGNOSIS — I34.1 NONRHEUMATIC MITRAL (VALVE) PROLAPSE: ICD-10-CM

## 2023-12-14 DIAGNOSIS — I10 ESSENTIAL (PRIMARY) HYPERTENSION: ICD-10-CM

## 2023-12-14 DIAGNOSIS — R42 DIZZINESS AND GIDDINESS: ICD-10-CM

## 2023-12-14 PROCEDURE — 93320 DOPPLER ECHO COMPLETE: CPT

## 2023-12-14 PROCEDURE — 93351 STRESS TTE COMPLETE: CPT

## 2023-12-14 PROCEDURE — 93325 DOPPLER ECHO COLOR FLOW MAPG: CPT

## 2023-12-14 PROCEDURE — 99214 OFFICE O/P EST MOD 30 MIN: CPT

## 2023-12-14 RX ORDER — LOSARTAN POTASSIUM 25 MG/1
25 TABLET, FILM COATED ORAL DAILY
Qty: 90 | Refills: 3 | Status: ACTIVE | COMMUNITY
Start: 2023-07-13 | End: 1900-01-01

## 2023-12-14 NOTE — PHYSICAL EXAM
[Well Developed] : well developed [Well Nourished] : well nourished [No Acute Distress] : no acute distress [Normal Conjunctiva] : normal conjunctiva [Normal Venous Pressure] : normal venous pressure [No Carotid Bruit] : no carotid bruit [Normal S1, S2] : normal S1, S2 [No Rub] : no rub [No Gallop] : no gallop [Clear Lung Fields] : clear lung fields [Good Air Entry] : good air entry [No Respiratory Distress] : no respiratory distress  [Soft] : abdomen soft [Non Tender] : non-tender [No Masses/organomegaly] : no masses/organomegaly [Normal Bowel Sounds] : normal bowel sounds [Normal Gait] : normal gait [No Edema] : no edema [No Cyanosis] : no cyanosis [No Clubbing] : no clubbing [No Varicosities] : no varicosities [No Rash] : no rash [No Skin Lesions] : no skin lesions [Moves all extremities] : moves all extremities [No Focal Deficits] : no focal deficits [Normal Speech] : normal speech [Alert and Oriented] : alert and oriented [Normal memory] : normal memory [de-identified] : 2/6 systolic ejection murmur heard best at the left sternal border, [de-identified] :  right dorsalis pedis 1+, left 2+

## 2023-12-14 NOTE — DISCUSSION/SUMMARY
[FreeTextEntry1] : The patient has a history of mitral valve prolapse.  She is physically active without difficulty.  She has a history of an abnormal EKG.  The echocardiogram shows posterior leaflet mitral valve prolapse with mild to moderate mitral regurgitation.  There is mild diastolic dysfunction.  Ejection fraction is 60%.  Stress echocardiogram was normal.  Significant coronary artery disease is unlikely.  Laboratory testing was reviewed.  Cholesterol has been excellent.  She will continue atorvastatin.

## 2023-12-14 NOTE — HISTORY OF PRESENT ILLNESS
[FreeTextEntry1] : Patient has had dizziness which she describes as vertigo.  She walks 30 to 45 minutes without difficulty.  She also climbs 3 flights of stairs.  He denies chest discomfort or palpitations.

## 2024-05-01 ENCOUNTER — OFFICE (OUTPATIENT)
Dept: URBAN - METROPOLITAN AREA CLINIC 28 | Facility: CLINIC | Age: 74
Setting detail: OPHTHALMOLOGY
End: 2024-05-01
Payer: MEDICARE

## 2024-05-01 DIAGNOSIS — H16.223: ICD-10-CM

## 2024-05-01 DIAGNOSIS — H52.4: ICD-10-CM

## 2024-05-01 DIAGNOSIS — H40.013: ICD-10-CM

## 2024-05-01 DIAGNOSIS — H11.153: ICD-10-CM

## 2024-05-01 DIAGNOSIS — H25.13: ICD-10-CM

## 2024-05-01 PROCEDURE — 92015 DETERMINE REFRACTIVE STATE: CPT | Performed by: OPHTHALMOLOGY

## 2024-05-01 PROCEDURE — 92012 INTRM OPH EXAM EST PATIENT: CPT | Performed by: OPHTHALMOLOGY

## 2024-05-01 ASSESSMENT — CONFRONTATIONAL VISUAL FIELD TEST (CVF)
OD_FINDINGS: FULL
OS_FINDINGS: FULL

## 2024-08-09 ENCOUNTER — APPOINTMENT (OUTPATIENT)
Dept: HEART AND VASCULAR | Facility: CLINIC | Age: 74
End: 2024-08-09

## 2024-08-09 PROCEDURE — 99213 OFFICE O/P EST LOW 20 MIN: CPT | Mod: 25

## 2024-08-09 PROCEDURE — 93000 ELECTROCARDIOGRAM COMPLETE: CPT

## 2024-08-09 NOTE — PHYSICAL EXAM
[Well Developed] : well developed [Well Nourished] : well nourished [No Acute Distress] : no acute distress [Normal Conjunctiva] : normal conjunctiva [Normal Venous Pressure] : normal venous pressure [No Carotid Bruit] : no carotid bruit [Normal S1, S2] : normal S1, S2 [No Rub] : no rub [No Gallop] : no gallop [Clear Lung Fields] : clear lung fields [Good Air Entry] : good air entry [No Respiratory Distress] : no respiratory distress  [Soft] : abdomen soft [Non Tender] : non-tender [No Masses/organomegaly] : no masses/organomegaly [Normal Bowel Sounds] : normal bowel sounds [Normal Gait] : normal gait [No Edema] : no edema [No Cyanosis] : no cyanosis [No Clubbing] : no clubbing [No Varicosities] : no varicosities [No Rash] : no rash [No Skin Lesions] : no skin lesions [Moves all extremities] : moves all extremities [No Focal Deficits] : no focal deficits [Normal Speech] : normal speech [Alert and Oriented] : alert and oriented [Normal memory] : normal memory [de-identified] :  right dorsalis pedis 1+, left 2+ [de-identified] : 2/6 systolic ejection murmur heard best at the left sternal border,

## 2024-08-09 NOTE — DISCUSSION/SUMMARY
[EKG obtained to assist in diagnosis and management of assessed problem(s)] : EKG obtained to assist in diagnosis and management of assessed problem(s) [FreeTextEntry1] : 74 year old female with PMHx HLD, HTN, MVP, mitral regurgitation presents today for follow up.  EKG today: sinus uche cardia 57 bpm, low voltage, T wave abnormalities, unchanged from previous EKG  HTN: BP goal < 130/80. Elevated BP readings in office but home blood pressure readings are WNL and acceptable. Controlled. Continue Losartan 25 and home blood pressure readings. Discussed low salt diet. HLD / CAD risk: Asymptomatic. Stress echocardiogram in 12/2023 negative for ischemia. Significant coronary disease is unlikely. LDL goal < 100, most recent LDL 84, controlled. Continue Atorvastatin 10. Discussed healthy diet and exercise. MVP / mitral regurgitation: Asymptomatic. Echocardiogram in 12/2023 revealed mitral valve prolapse and mild-moderate mitral regurgitation. Continue to monitor with echocardiogram.  Follow up 6 months  I have discussed the case with TERESA Brock. I have personally performed a history, physical exam, and my own medical decision making. I have reviewed the note and agree with the findings and plan.   The EKG shows low voltage and mild T wave abnormalities.  The patient will return for echocardiogram.

## 2024-08-09 NOTE — HISTORY OF PRESENT ILLNESS
[FreeTextEntry1] : 74 year old female with PMHx HLD, HTN, MVP, mitral regurgitation presents today for follow up.  Since last visit, she has been well. No acute concerns. She travels often so this affects her diet. She eats out often but always tries to eat meals that include vegetables and are lower in salt, saturated fats, sugars.  She continues to be active. She walks 1.5-2 miles daily without chest pain or dyspea. She is able to walk up 3 flights of stairs without difficulty.   She measures her blood pressure at home, 100-110s/60-70s.   Denies chest pain or dyspnea at rest, palpitations, orthopnea, lightheadedness, syncope, recent falls, neurofocal deficits, edema. ================== Previous workup: Labs (6/2024): A1c 5.8%  TG 86 HDL 85 LDL 84 Cr 0.68  Stress echo (12/2023): negative for ischemia  Echo (12/2023): 1. Left ventricular cavity is normal. Left ventricular wall thickness is normal. Left ventricular systolic function is normal with an ejection fraction of 55 % by Guzman's method of disks. There are no regional wall motion abnormalities seen. 2. There is mild (grade 1) left ventricular diastolic dysfunction. 3. Normal right ventricular cavity size, wall thickness, and systolic function. 4. Prolapse of the posterior mitral leaflet. 5. Mild to moderate mitral regurgitation. 6. No pericardial effusion seen.

## 2024-11-07 ENCOUNTER — OFFICE (OUTPATIENT)
Dept: URBAN - METROPOLITAN AREA CLINIC 28 | Facility: CLINIC | Age: 74
Setting detail: OPHTHALMOLOGY
End: 2024-11-07
Payer: MEDICARE

## 2024-11-07 DIAGNOSIS — H16.223: ICD-10-CM

## 2024-11-07 DIAGNOSIS — H11.153: ICD-10-CM

## 2024-11-07 DIAGNOSIS — H40.013: ICD-10-CM

## 2024-11-07 DIAGNOSIS — H25.13: ICD-10-CM

## 2024-11-07 PROCEDURE — 92133 CPTRZD OPH DX IMG PST SGM ON: CPT | Performed by: OPHTHALMOLOGY

## 2024-11-07 PROCEDURE — 92014 COMPRE OPH EXAM EST PT 1/>: CPT | Performed by: OPHTHALMOLOGY

## 2024-11-07 ASSESSMENT — REFRACTION_CURRENTRX
OD_OVR_VA: 20/
OD_SPHERE: +1.00
OS_ADD: +3.00
OS_SPHERE: +1.75
OS_CYLINDER: -0.50
OD_AXIS: 075
OS_OVR_VA: 20/
OD_OVR_VA: 20/
OD_SPHERE: +1.00
OS_ADD: +2.75
OS_AXIS: 094
OD_CYLINDER: -0.50
OD_AXIS: 079
OD_ADD: +2.75
OD_ADD: +3.00
OS_OVR_VA: 20/
OS_AXIS: 097
OD_CYLINDER: -0.75
OS_CYLINDER: -0.75
OS_SPHERE: +2.00

## 2024-11-07 ASSESSMENT — KERATOMETRY
OS_K2POWER_DIOPTERS: 44.00
OS_K1POWER_DIOPTERS: 43.50
OD_K2POWER_DIOPTERS: 44.00
OD_K1POWER_DIOPTERS: 43.50
OD_AXISANGLE_DEGREES: 154
OS_AXISANGLE_DEGREES: 013

## 2024-11-07 ASSESSMENT — REFRACTION_MANIFEST
OS_CYLINDER: -0.50
OD_ADD: +3.00
OU_VA: 20/20-1
OD_SPHERE: +0.75
OD_VA1: 20/25+3
OS_AXIS: 100
OS_SPHERE: +1.75
OD_AXIS: 075
OS_ADD: +3.00
OS_VA1: 20/25
OD_CYLINDER: -0.50

## 2024-11-07 ASSESSMENT — SUPERFICIAL PUNCTATE KERATITIS (SPK)
OS_SPK: T
OD_SPK: T

## 2024-11-07 ASSESSMENT — VISUAL ACUITY
OD_BCVA: 20/30-1
OS_BCVA: 20/20

## 2024-11-07 ASSESSMENT — REFRACTION_AUTOREFRACTION
OS_AXIS: 095
OS_SPHERE: +2.00
OD_CYLINDER: -1.00
OD_SPHERE: +1.50
OD_AXIS: 080
OS_CYLINDER: -0.75

## 2024-11-07 ASSESSMENT — CONFRONTATIONAL VISUAL FIELD TEST (CVF)
OD_FINDINGS: FULL
OS_FINDINGS: FULL

## 2024-11-07 ASSESSMENT — TONOMETRY: OS_IOP_MMHG: 21

## 2024-11-07 ASSESSMENT — PACHYMETRY
OD_CT_CORRECTION: 0
OS_CT_CORRECTION: 0
OD_CT_UM: 544
OS_CT_UM: 545

## 2024-11-07 ASSESSMENT — TEAR BREAK UP TIME (TBUT)
OD_TBUT: 1+
OS_TBUT: 1+

## 2025-03-20 ENCOUNTER — NON-APPOINTMENT (OUTPATIENT)
Age: 75
End: 2025-03-20

## 2025-03-20 ENCOUNTER — APPOINTMENT (OUTPATIENT)
Dept: HEART AND VASCULAR | Facility: CLINIC | Age: 75
End: 2025-03-20
Payer: MEDICARE

## 2025-03-20 VITALS
HEIGHT: 60 IN | WEIGHT: 136 LBS | OXYGEN SATURATION: 96 % | BODY MASS INDEX: 26.7 KG/M2 | HEART RATE: 73 BPM | SYSTOLIC BLOOD PRESSURE: 132 MMHG | DIASTOLIC BLOOD PRESSURE: 87 MMHG

## 2025-03-20 VITALS — DIASTOLIC BLOOD PRESSURE: 77 MMHG | SYSTOLIC BLOOD PRESSURE: 126 MMHG

## 2025-03-20 DIAGNOSIS — I34.1 NONRHEUMATIC MITRAL (VALVE) PROLAPSE: ICD-10-CM

## 2025-03-20 DIAGNOSIS — I10 ESSENTIAL (PRIMARY) HYPERTENSION: ICD-10-CM

## 2025-03-20 DIAGNOSIS — R94.31 ABNORMAL ELECTROCARDIOGRAM [ECG] [EKG]: ICD-10-CM

## 2025-03-20 DIAGNOSIS — I34.0 NONRHEUMATIC MITRAL (VALVE) INSUFFICIENCY: ICD-10-CM

## 2025-03-20 PROCEDURE — 93306 TTE W/DOPPLER COMPLETE: CPT

## 2025-03-20 PROCEDURE — 99214 OFFICE O/P EST MOD 30 MIN: CPT | Mod: 25

## 2025-03-20 PROCEDURE — 93000 ELECTROCARDIOGRAM COMPLETE: CPT

## 2025-03-20 PROCEDURE — ZZZZZ: CPT | Mod: NC

## 2025-05-07 ENCOUNTER — OFFICE (OUTPATIENT)
Dept: URBAN - METROPOLITAN AREA CLINIC 28 | Facility: CLINIC | Age: 75
Setting detail: OPHTHALMOLOGY
End: 2025-05-07
Payer: MEDICARE

## 2025-05-07 DIAGNOSIS — H25.13: ICD-10-CM

## 2025-05-07 DIAGNOSIS — H16.223: ICD-10-CM

## 2025-05-07 DIAGNOSIS — H40.013: ICD-10-CM

## 2025-05-07 DIAGNOSIS — H25.12: ICD-10-CM

## 2025-05-07 DIAGNOSIS — H11.153: ICD-10-CM

## 2025-05-07 PROBLEM — H25.11 CATARACT SENILE NUCLEAR SCLEROSIS; RIGHT EYE, LEFT EYE, BOTH EYES: Status: ACTIVE | Noted: 2025-05-07

## 2025-05-07 PROCEDURE — 92136 OPHTHALMIC BIOMETRY: CPT | Mod: TC | Performed by: OPHTHALMOLOGY

## 2025-05-07 PROCEDURE — 92136 OPHTHALMIC BIOMETRY: CPT | Mod: 26,LT | Performed by: OPHTHALMOLOGY

## 2025-05-07 PROCEDURE — 92014 COMPRE OPH EXAM EST PT 1/>: CPT | Performed by: OPHTHALMOLOGY

## 2025-05-07 ASSESSMENT — KERATOMETRY
OD_AXISANGLE2_DEGREES: 137
OS_K2POWER_DIOPTERS: 44.25
OD_K2POWER_DIOPTERS: 44.25
OD_K1K2_AVERAGE: 43.875
OS_CYLPOWER_DEGREES: 0.5
OD_CYLPOWER_DEGREES: 0.75
OD_CYLAXISANGLE_DEGREES: 137
OS_AXISANGLE_DEGREES: 035
OD_K1POWER_DIOPTERS: 43.50
OS_AXISANGLE_DEGREES: 125
OD_AXISANGLE_DEGREES: 137
OS_K1POWER_DIOPTERS: 43.75
OS_K1POWER_DIOPTERS: 43.75
OD_K1POWER_DIOPTERS: 43.50
OD_AXISANGLE_DEGREES: 47
OS_CYLAXISANGLE_DEGREES: 035
OD_K2POWER_DIOPTERS: 44.25
OS_K2POWER_DIOPTERS: 44.25
OS_K1K2_AVERAGE: 44
OS_AXISANGLE2_DEGREES: 035

## 2025-05-07 ASSESSMENT — REFRACTION_CURRENTRX
OS_AXIS: 094
OD_CYLINDER: -0.75
OS_AXIS: 097
OS_CYLINDER: -0.75
OS_SPHERE: +2.00
OS_OVR_VA: 20/
OD_AXIS: 075
OD_SPHERE: +1.00
OD_OVR_VA: 20/
OD_CYLINDER: -0.50
OD_SPHERE: +1.00
OS_SPHERE: +1.75
OD_ADD: +2.75
OD_OVR_VA: 20/
OS_ADD: +2.75
OD_AXIS: 079
OD_ADD: +3.00
OS_CYLINDER: -0.50
OS_OVR_VA: 20/
OS_ADD: +3.00

## 2025-05-07 ASSESSMENT — REFRACTION_MANIFEST
OS_SPHERE: +1.75
OS_ADD: +3.00
OS_AXIS: 100
OU_VA: 20/20-1
OS_CYLINDER: -0.50
OD_SPHERE: +0.75
OD_CYLINDER: -0.50
OS_VA1: 20/25
OD_VA1: 20/25+3
OD_AXIS: 075
OD_ADD: +3.00

## 2025-05-07 ASSESSMENT — REFRACTION_AUTOREFRACTION
OS_SPHERE: +1.75
OD_AXIS: 077
OS_AXIS: 098
OS_CYLINDER: -0.75
OD_SPHERE: +1.00
OD_CYLINDER: -0.75

## 2025-05-07 ASSESSMENT — CONFRONTATIONAL VISUAL FIELD TEST (CVF)
OD_FINDINGS: FULL
OS_FINDINGS: FULL

## 2025-05-07 ASSESSMENT — PACHYMETRY
OS_CT_UM: 545
OS_CT_CORRECTION: 0
OD_CT_UM: 544
OD_CT_CORRECTION: 0

## 2025-05-07 ASSESSMENT — VISUAL ACUITY
OD_BCVA: 20/30
OS_BCVA: 20/20

## 2025-05-07 ASSESSMENT — TONOMETRY
OD_IOP_MMHG: 18
OS_IOP_MMHG: 17

## 2025-05-07 ASSESSMENT — TEAR BREAK UP TIME (TBUT)
OD_TBUT: 1+
OS_TBUT: 1+

## 2025-07-07 ENCOUNTER — RX ONLY (RX ONLY)
Age: 75
End: 2025-07-07

## 2025-07-22 ENCOUNTER — AMBULATORY SURGERY CENTER (OUTPATIENT)
Dept: URBAN - METROPOLITAN AREA CLINIC 75 | Facility: CLINIC | Age: 75
Setting detail: OPHTHALMOLOGY
End: 2025-07-22
Payer: MEDICARE

## 2025-07-22 DIAGNOSIS — H25.12: ICD-10-CM

## 2025-07-22 DIAGNOSIS — H52.222: ICD-10-CM

## 2025-07-22 PROCEDURE — S9986 NOT MEDICALLY NECESSARY SVC: HCPCS | Mod: GX,GY | Performed by: OPHTHALMOLOGY

## 2025-07-22 PROCEDURE — FEMTO PRECISION LASER CATARACT SURGERY: Mod: GY,LT | Performed by: OPHTHALMOLOGY

## 2025-07-22 PROCEDURE — 66984 XCAPSL CTRC RMVL W/O ECP: CPT | Mod: LT | Performed by: OPHTHALMOLOGY

## 2025-07-23 ENCOUNTER — OFFICE (OUTPATIENT)
Dept: URBAN - METROPOLITAN AREA CLINIC 28 | Facility: CLINIC | Age: 75
Setting detail: OPHTHALMOLOGY
End: 2025-07-23
Payer: MEDICARE

## 2025-07-23 DIAGNOSIS — Z96.1: ICD-10-CM

## 2025-07-23 DIAGNOSIS — H16.222: ICD-10-CM

## 2025-07-23 DIAGNOSIS — H11.152: ICD-10-CM

## 2025-07-23 DIAGNOSIS — H40.013: ICD-10-CM

## 2025-07-23 PROCEDURE — 99024 POSTOP FOLLOW-UP VISIT: CPT | Performed by: OPHTHALMOLOGY

## 2025-07-23 ASSESSMENT — REFRACTION_CURRENTRX
OD_OVR_VA: 20/
OD_SPHERE: +1.00
OD_OVR_VA: 20/
OS_ADD: +2.75
OD_CYLINDER: -0.50
OD_ADD: +2.75
OS_CYLINDER: -0.75
OD_AXIS: 075
OS_SPHERE: +1.75
OS_AXIS: 094
OS_CYLINDER: -0.50
OS_OVR_VA: 20/
OS_SPHERE: +2.00
OS_ADD: +3.00
OS_AXIS: 097
OD_AXIS: 079
OS_OVR_VA: 20/
OD_SPHERE: +1.00
OD_CYLINDER: -0.75
OD_ADD: +3.00

## 2025-07-23 ASSESSMENT — REFRACTION_AUTOREFRACTION
OS_CYLINDER: -0.75
OS_AXIS: 098
OD_SPHERE: +1.00
OD_AXIS: 077
OS_SPHERE: +1.75
OD_CYLINDER: -0.75

## 2025-07-23 ASSESSMENT — REFRACTION_MANIFEST
OS_AXIS: 100
OD_AXIS: 075
OS_CYLINDER: -0.50
OD_VA1: 20/25+3
OS_SPHERE: +1.75
OD_ADD: +3.00
OD_CYLINDER: -0.50
OU_VA: 20/20-1
OS_ADD: +3.00
OD_SPHERE: +0.75
OS_VA1: 20/25

## 2025-07-23 ASSESSMENT — PACHYMETRY
OD_CT_UM: 544
OD_CT_CORRECTION: 0
OS_CT_UM: 545
OS_CT_CORRECTION: 0

## 2025-07-23 ASSESSMENT — KERATOMETRY
OD_K2POWER_DIOPTERS: 44.25
OS_K1POWER_DIOPTERS: 43.75
OD_K1POWER_DIOPTERS: 43.50
OD_AXISANGLE_DEGREES: 137
OS_K2POWER_DIOPTERS: 44.25
OS_AXISANGLE_DEGREES: 035

## 2025-07-23 ASSESSMENT — TEAR BREAK UP TIME (TBUT)
OS_TBUT: 1+
OD_TBUT: 1+

## 2025-07-23 ASSESSMENT — VISUAL ACUITY
OS_BCVA: 20/20
OD_BCVA: 20/20-1

## 2025-07-23 ASSESSMENT — CONFRONTATIONAL VISUAL FIELD TEST (CVF)
OS_FINDINGS: FULL
OD_FINDINGS: FULL

## 2025-07-23 ASSESSMENT — TONOMETRY: OS_IOP_MMHG: 16

## 2025-07-30 ENCOUNTER — OFFICE (OUTPATIENT)
Dept: URBAN - METROPOLITAN AREA CLINIC 28 | Facility: CLINIC | Age: 75
Setting detail: OPHTHALMOLOGY
End: 2025-07-30
Payer: MEDICARE

## 2025-07-30 DIAGNOSIS — H16.222: ICD-10-CM

## 2025-07-30 DIAGNOSIS — H40.013: ICD-10-CM

## 2025-07-30 DIAGNOSIS — Z96.1: ICD-10-CM

## 2025-07-30 PROBLEM — H11.152 PINGUECULA;  , LEFT EYE: Status: ACTIVE | Noted: 2025-07-23

## 2025-07-30 PROCEDURE — 99024 POSTOP FOLLOW-UP VISIT: CPT | Performed by: OPHTHALMOLOGY

## 2025-07-30 ASSESSMENT — REFRACTION_MANIFEST
OD_SPHERE: +0.75
OS_SPHERE: PLANO
OD_ADD: +3.00
OS_SPHERE: +1.75
OS_VA1: 20/20-1
OD_VA1: 20/25+3
OS_AXIS: 085
OD_AXIS: 075
OS_ADD: +3.00
OD_CYLINDER: -0.50
OU_VA: 20/20-1
OS_CYLINDER: -0.50
OS_VA1: 20/25
OS_CYLINDER: -0.50
OS_AXIS: 100

## 2025-07-30 ASSESSMENT — REFRACTION_CURRENTRX
OD_AXIS: 079
OS_ADD: +2.75
OD_ADD: +2.75
OS_AXIS: 097
OD_SPHERE: +1.00
OD_CYLINDER: -0.50
OS_OVR_VA: 20/
OS_CYLINDER: -0.50
OD_ADD: +3.00
OS_OVR_VA: 20/
OS_CYLINDER: -0.75
OS_ADD: +3.00
OD_AXIS: 075
OD_OVR_VA: 20/
OD_CYLINDER: -0.75
OD_OVR_VA: 20/
OD_SPHERE: +1.00
OS_AXIS: 094
OS_SPHERE: +1.75
OS_SPHERE: +2.00

## 2025-07-30 ASSESSMENT — KERATOMETRY
OD_AXISANGLE_DEGREES: 152
OD_K2POWER_DIOPTERS: 44.00
OS_AXISANGLE_DEGREES: 174
OS_K2POWER_DIOPTERS: 44.00
OD_K1POWER_DIOPTERS: 43.50
OS_K1POWER_DIOPTERS: 43.75

## 2025-07-30 ASSESSMENT — PACHYMETRY
OD_CT_UM: 544
OS_CT_CORRECTION: 0
OD_CT_CORRECTION: 0
OS_CT_UM: 545

## 2025-07-30 ASSESSMENT — VISUAL ACUITY
OD_BCVA: 20/20
OS_BCVA: 20/25-

## 2025-07-30 ASSESSMENT — CONFRONTATIONAL VISUAL FIELD TEST (CVF)
OD_FINDINGS: FULL
OS_FINDINGS: FULL

## 2025-07-30 ASSESSMENT — REFRACTION_AUTOREFRACTION
OD_AXIS: 079
OS_CYLINDER: -0.50
OS_SPHERE: 0.00
OD_CYLINDER: -1.00
OD_SPHERE: +1.25
OS_AXIS: 085

## 2025-07-30 ASSESSMENT — TEAR BREAK UP TIME (TBUT)
OS_TBUT: 1+
OD_TBUT: 1+

## 2025-07-30 ASSESSMENT — TONOMETRY: OS_IOP_MMHG: 21

## 2025-08-11 ENCOUNTER — OFFICE (OUTPATIENT)
Dept: URBAN - METROPOLITAN AREA CLINIC 28 | Facility: CLINIC | Age: 75
Setting detail: OPHTHALMOLOGY
End: 2025-08-11
Payer: MEDICARE

## 2025-08-11 DIAGNOSIS — H52.4: ICD-10-CM

## 2025-08-11 DIAGNOSIS — H16.222: ICD-10-CM

## 2025-08-11 DIAGNOSIS — H25.11: ICD-10-CM

## 2025-08-11 DIAGNOSIS — Z96.1: ICD-10-CM

## 2025-08-11 PROBLEM — H11.152: Status: ACTIVE | Noted: 2025-08-11

## 2025-08-11 PROCEDURE — 99024 POSTOP FOLLOW-UP VISIT: CPT | Performed by: OPHTHALMOLOGY

## 2025-08-11 PROCEDURE — 92015 DETERMINE REFRACTIVE STATE: CPT | Performed by: OPHTHALMOLOGY

## 2025-08-11 ASSESSMENT — REFRACTION_MANIFEST
OD_SPHERE: +0.75
OS_AXIS: 085
OS_SPHERE: +1.75
OS_CYLINDER: -0.50
OD_VA1: 20/20-2
OS_CYLINDER: -0.50
OS_CYLINDER: -0.50
OS_VA1: 20/20-1
OD_AXIS: 075
OS_ADD: +2.75
OU_VA: 20/20-1
OD_SPHERE: +1.00
OD_AXIS: 080
OS_AXIS: 085
OD_VA1: 20/25+3
OD_ADD: +3.00
OS_AXIS: 100
OS_SPHERE: PLANO
OS_VA1: 20/25
OD_CYLINDER: -0.75
OU_VA: 20/20
OD_ADD: +2.75
OD_CYLINDER: -0.50
OS_SPHERE: PLANO
OS_VA1: 20/20
OS_ADD: +3.00

## 2025-08-11 ASSESSMENT — REFRACTION_CURRENTRX
OS_OVR_VA: 20/
OS_AXIS: 097
OS_SPHERE: +2.00
OD_ADD: +2.75
OS_AXIS: 094
OS_ADD: +3.00
OD_SPHERE: +1.00
OS_SPHERE: +1.75
OD_SPHERE: +1.00
OS_CYLINDER: -0.50
OS_ADD: +2.75
OD_AXIS: 079
OD_ADD: +3.00
OS_OVR_VA: 20/
OD_OVR_VA: 20/
OD_AXIS: 075
OD_OVR_VA: 20/
OD_CYLINDER: -0.50
OD_CYLINDER: -0.75
OS_CYLINDER: -0.75

## 2025-08-11 ASSESSMENT — VISUAL ACUITY
OS_BCVA: 20/25-1
OD_BCVA: 20/25-2

## 2025-08-11 ASSESSMENT — PACHYMETRY
OD_CT_CORRECTION: 0
OS_CT_CORRECTION: 0
OS_CT_UM: 545
OD_CT_UM: 544

## 2025-08-11 ASSESSMENT — KERATOMETRY
OS_K2POWER_DIOPTERS: 44.25
OD_K1POWER_DIOPTERS: 43.50
OD_K2POWER_DIOPTERS: 44.25
OS_K1POWER_DIOPTERS: 43.50
OS_AXISANGLE_DEGREES: 175
OD_AXISANGLE_DEGREES: 133

## 2025-08-11 ASSESSMENT — TEAR BREAK UP TIME (TBUT)
OS_TBUT: 1+
OD_TBUT: 1+

## 2025-08-11 ASSESSMENT — CONFRONTATIONAL VISUAL FIELD TEST (CVF)
OS_FINDINGS: FULL
OD_FINDINGS: FULL

## 2025-08-11 ASSESSMENT — REFRACTION_AUTOREFRACTION
OD_SPHERE: +1.25
OS_AXIS: 088
OD_CYLINDER: -0.75
OS_SPHERE: +0.25
OS_CYLINDER: -0.75
OD_AXIS: 077

## 2025-08-11 ASSESSMENT — TONOMETRY: OS_IOP_MMHG: 17

## 2025-09-15 ENCOUNTER — NON-APPOINTMENT (OUTPATIENT)
Age: 75
End: 2025-09-15

## 2025-09-19 ENCOUNTER — APPOINTMENT (OUTPATIENT)
Dept: HEART AND VASCULAR | Facility: CLINIC | Age: 75
End: 2025-09-19
Payer: MEDICARE

## 2025-09-19 VITALS
RESPIRATION RATE: 18 BRPM | BODY MASS INDEX: 26.5 KG/M2 | HEIGHT: 60 IN | WEIGHT: 135 LBS | DIASTOLIC BLOOD PRESSURE: 80 MMHG | SYSTOLIC BLOOD PRESSURE: 120 MMHG | HEART RATE: 74 BPM

## 2025-09-19 DIAGNOSIS — I10 ESSENTIAL (PRIMARY) HYPERTENSION: ICD-10-CM

## 2025-09-19 DIAGNOSIS — I34.1 NONRHEUMATIC MITRAL (VALVE) PROLAPSE: ICD-10-CM

## 2025-09-19 DIAGNOSIS — R94.31 ABNORMAL ELECTROCARDIOGRAM [ECG] [EKG]: ICD-10-CM

## 2025-09-19 PROCEDURE — 93351 STRESS TTE COMPLETE: CPT

## 2025-09-19 PROCEDURE — 99213 OFFICE O/P EST LOW 20 MIN: CPT | Mod: 25
